# Patient Record
Sex: FEMALE | Employment: UNEMPLOYED | URBAN - METROPOLITAN AREA
[De-identification: names, ages, dates, MRNs, and addresses within clinical notes are randomized per-mention and may not be internally consistent; named-entity substitution may affect disease eponyms.]

---

## 2021-03-17 ENCOUNTER — ANCILLARY PROCEDURE (OUTPATIENT)
Dept: GENERAL RADIOLOGY | Facility: CLINIC | Age: 41
End: 2021-03-17
Attending: INTERNAL MEDICINE
Payer: COMMERCIAL

## 2021-03-17 ENCOUNTER — OFFICE VISIT (OUTPATIENT)
Dept: INTERNAL MEDICINE | Facility: CLINIC | Age: 41
End: 2021-03-17
Payer: COMMERCIAL

## 2021-03-17 VITALS
OXYGEN SATURATION: 100 % | RESPIRATION RATE: 16 BRPM | HEART RATE: 97 BPM | SYSTOLIC BLOOD PRESSURE: 110 MMHG | WEIGHT: 225 LBS | BODY MASS INDEX: 34.1 KG/M2 | TEMPERATURE: 98 F | HEIGHT: 68 IN | DIASTOLIC BLOOD PRESSURE: 76 MMHG

## 2021-03-17 DIAGNOSIS — M25.562 CHRONIC PAIN OF LEFT KNEE: ICD-10-CM

## 2021-03-17 DIAGNOSIS — M25.562 CHRONIC PAIN OF LEFT KNEE: Primary | ICD-10-CM

## 2021-03-17 DIAGNOSIS — G89.29 CHRONIC PAIN OF LEFT KNEE: Primary | ICD-10-CM

## 2021-03-17 DIAGNOSIS — G89.29 CHRONIC PAIN OF LEFT KNEE: ICD-10-CM

## 2021-03-17 PROCEDURE — 99203 OFFICE O/P NEW LOW 30 MIN: CPT | Performed by: INTERNAL MEDICINE

## 2021-03-17 PROCEDURE — 73562 X-RAY EXAM OF KNEE 3: CPT | Mod: LT | Performed by: RADIOLOGY

## 2021-03-17 SDOH — HEALTH STABILITY: MENTAL HEALTH: HOW OFTEN DO YOU HAVE 6 OR MORE DRINKS ON ONE OCCASION?: NEVER

## 2021-03-17 SDOH — HEALTH STABILITY: MENTAL HEALTH: HOW OFTEN DO YOU HAVE A DRINK CONTAINING ALCOHOL?: NEVER

## 2021-03-17 SDOH — HEALTH STABILITY: MENTAL HEALTH: HOW MANY STANDARD DRINKS CONTAINING ALCOHOL DO YOU HAVE ON A TYPICAL DAY?: NOT ASKED

## 2021-03-17 ASSESSMENT — MIFFLIN-ST. JEOR: SCORE: 1726.15

## 2021-03-17 ASSESSMENT — ENCOUNTER SYMPTOMS
JOINT SWELLING: 1
ARTHRALGIAS: 1

## 2021-03-17 NOTE — PATIENT INSTRUCTIONS
Patient Education     Knee Pain  Knee pain is very common. It s especially common in active people who put a lot of pressure on their knees, like runners. It affects women more often than men.  Your kneecap (patella) is a thick, round bone. It covers and protects the front portion of your knee joint. It moves along a groove in your thighbone (femur) as part of the patellofemoral joint. A layer of cartilage surrounds the underside of your kneecap. This layer protects it from grinding against your femur.  When this cartilage softens and breaks down, it can cause knee pain. This is partly because of repetitive stress. The stress irritates the lining of the joint. This causes pain in the underlying bone.  What causes knee pain?  Many things can cause knee pain. You may have more than one cause. Some of these include:    Overuse of the knee joint    The kneecap doesn t line up with the tissue around it    Damage to small nerves in the area    Damage to the ligament-like structure that holds the kneecap in place (retinaculum)    Breakdown of the bone under the cartilage    Swelling in the soft tissues around the kneecap    Injury  You might be more likely to have knee pain if you:    Exercise a lot    Recently increased the intensity of your workouts    Have a body mass index (BMI) greater than 25    Have poor alignment of your kneecap    Walk with your feet turned overly outward or inward    Have weakness in surrounding muscle groups (inner quad or hip adductor muscles)    Have too much tightness in surrounding muscle groups (hamstrings or iliotibial band)    Have a recent history of injury to the area    Are female  Symptoms of knee pain  This type of knee pain is a dull, aching pain in the front of the knee in the area under and around the kneecap. This pain may start quickly or slowly. Your pain might be worse when you squat, run, or sit for a long time. Climbing stairs may be painful or hard to do. You might also  sometimes feel like your knee is giving out. You may have symptoms in one or both of your knees.  Diagnosing knee pain  Your healthcare provider will ask about your medical history and your symptoms. Be sure to describe any activities that make your knee pain worse. He or she will look at your knee. This will include tests of your range of motion, strength, and areas of pain of your knee. Your knee alignment will be checked.  Your healthcare provider will need to rule out other causes of your knee pain, such as arthritis. You may need an imaging test, such as an X-ray or MRI.  Treatment for knee pain  Treatments that can help ease your symptoms may include:    Avoiding activities for a while that make your pain worse, returning to activity over time    Icing the outside of your knee when it causes you pain    Taking over-the-counter pain medicine such as NSAIDs    Wearing a knee brace or taping your knee to support it    Compression to help prevent swelling    Wearing special shoe inserts to help keep your feet in the proper alignment    Elevating your knee    Doing special exercises to stretch and strengthen the muscles around your hip and your knee  These steps help most people manage knee pain. But some cases of knee pain need to be treated with surgery. You rarely need surgery right away. You may need it later if other treatments don t work. Your healthcare provider may refer you to an orthopedic surgeon. He or she will talk with you about your choices.  Preventing knee pain  Losing weight and correcting excess muscle tightness or muscle weakness may help lower your risk.  In some cases, you can prevent knee pain. To help prevent a flare-up of knee pain, do these things:    Regularly do all the exercises your doctor or physical therapist advises    Warm up fully before exercising    Support your knee as advised by your doctor or physical therapist    Increase training gradually, and ease up on training when  needed    Have an expert check your gait for running or other sporting activities    Stretch properly before and after exercise    Replace your running shoes regularly    Lose excess weight  When to call your healthcare provider  Call your healthcare provider right away if:    Your symptoms don t get better after a few weeks of treatment    You have any new symptoms  Yang last reviewed this educational content on 6/1/2019 2000-2020 [employer]. All rights reserved. This information is not intended as a substitute for professional medical care. Always follow your healthcare professional's instructions.

## 2021-03-17 NOTE — PROGRESS NOTES
"    Assessment & Plan   Problem List Items Addressed This Visit     None      Visit Diagnoses     Chronic pain of left knee    -  Primary    Relevant Orders    XR Knee Left 3 Views    Orthopedic & Spine  Referral         Ongoing knee pain with history of popliteal cyst and spurring in the knee joint Ortho referral placed with repeat knee x-ray.    Return if symptoms worsen or fail to improve.    Michael Islas MD  [facility] NICK Vu is a 41 year old who presents for the following health issues     HPI   Phone  used to communicate.    Left knee pain x 5 months. Gardually worsening.  When she walks or stand too long, swelling comes with pain. Pain is constant. When she uses her knee while praying it hurts a lot.  Denies recent fall/ injury.  She had a fall in 2017 and landed on left hip. Denies hip pain. Denies any medication use.     She went to  for the same knee pain and had knee injection and ultrasound in 11/2020.   Xray showed   IMPRESSION:  1. Mild to moderate hypertrophic spurring medial and lateral compartment of the knees.  2. Medial compartment narrowing mildly on the left and minimally on the right.  USG: There is a 2.2 x 2.5 x 0.6 cm popliteal cyst in the left popliteal fossa.    Review of Systems   Musculoskeletal: Positive for arthralgias, gait problem and joint swelling.          Objective    /76   Pulse 97   Temp 98  F (36.7  C) (Oral)   Resp 16   Ht 1.715 m (5' 7.5\")   Wt 102.1 kg (225 lb)   LMP 02/25/2021 (Approximate)   SpO2 100%   Breastfeeding No   BMI 34.72 kg/m    Body mass index is 34.72 kg/m .  Physical Exam  Vitals signs reviewed.   Musculoskeletal:      Comments: Left knee joint looks swollen.  There is significant swelling noted in the superior aspect of the knee joint.  Range of motion was restricted due to the discomfort.   Neurological:      Mental Status: She is alert.              "

## 2021-03-24 ENCOUNTER — OFFICE VISIT (OUTPATIENT)
Dept: ORTHOPEDICS | Facility: CLINIC | Age: 41
End: 2021-03-24
Attending: INTERNAL MEDICINE
Payer: COMMERCIAL

## 2021-03-24 VITALS
BODY MASS INDEX: 34.1 KG/M2 | WEIGHT: 225 LBS | HEIGHT: 68 IN | SYSTOLIC BLOOD PRESSURE: 112 MMHG | DIASTOLIC BLOOD PRESSURE: 72 MMHG

## 2021-03-24 DIAGNOSIS — G89.29 CHRONIC PAIN OF LEFT KNEE: ICD-10-CM

## 2021-03-24 DIAGNOSIS — M17.12 PRIMARY OSTEOARTHRITIS OF LEFT KNEE: Primary | ICD-10-CM

## 2021-03-24 DIAGNOSIS — M25.562 CHRONIC PAIN OF LEFT KNEE: ICD-10-CM

## 2021-03-24 DIAGNOSIS — E66.01 MORBID OBESITY (H): ICD-10-CM

## 2021-03-24 PROCEDURE — 99203 OFFICE O/P NEW LOW 30 MIN: CPT | Performed by: ORTHOPAEDIC SURGERY

## 2021-03-24 ASSESSMENT — MIFFLIN-ST. JEOR: SCORE: 1726.15

## 2021-03-24 NOTE — PATIENT INSTRUCTIONS
"We discussed the problem of arthritis today. Arthritis means that the joint surfaces that were once smooth are getting rough. When the rough joint surfaces are loaded and gliding, you often have pain, swelling, catching/popping, and locking type of symptoms. It can be episodic or constant. Even though the process is slow developing and chronic in nature, the symptoms can come on rather suddenly sometimes triggered by an injury or at other times without any identifiable event.  Typically, even with arthritis, most people can  function quite well with a common sense management which include: activity modifications, OTC medications (e.g.. Acetaminophen and Ibuprofen or Naproxen), icing, stretching and muscle strengthening. In general, staying active helps because it will help maintaining joint flexibility and muscle strength although \"overdoing\" and doing repetitively loading activities could worsen the symptoms. If you carry extra weight, losing weight should be a part of management of arthritis. I recommend diet along with gentle aerobic exercises such as walking, elliptical, swimming and stationary biking. Activities like jumping, pivoting, squatting, lunging, stair climbing, and kneeling are better to be avoided.  You can also try over- the- counter braces (especially for the knee arthritis) but since the problem is an internal issue, it is not always helpful.  Formal PT is not always necessary but can be helpful if you are not sure about what exercises to do. A discussion with a dietician can be very helpful if you decided to include weight loss as a part of the treatment.  If these measures are not effective, we often resort to the injection treatment, either cortisone or viscous joint supplement. It has been shown that viscous joint injections are not very effective if arthritis is advanced and at this point is approved only for knee arthritis. The potential benefit from injections are not permanent and for that " reason they can be repeated at a reasonable frequency. The duration of benefit is impossible to predict. Sometimes, it does not provide any noticeable benefit at all.  As you can imagine, surgical intervention is not the first line of management. It is important to remember that even with surgery one cannot cure arthritis unless joint replacement is considered. As was the case with the injection treatment, the response from arthroscopic surgeries  is unpredictable since we cannot make the joint surfaces back to perfectly smooth. It would be an option for someone who has tried all appropriate non-operative treatment modalities and is not quite ready for replacement type of permanent procedure. Arthroscopic procedures are more applicable to the knees, the shoulders and the ankles as opposed to the hips and fingers.          Voltaren gel. Can get this over the counter  Tylenol   Ibuprofen or Aleve

## 2021-03-24 NOTE — PROGRESS NOTES
HISTORY OF PRESENT ILLNESS:    Mirella Mays is a 41 year old female who is seen in consultation at the request of Dr. Islas for left knee pain. Onset of pain 5 months ago. She reports no injury, trauma.   Present symptoms: left knee pain located lateral knee pain. Pain radiates down the lower leg into the second toe. She notes occasional numbness of her lower leg.   Pain comes and goes.  Increased pain with walking and prolonged sitting.  Swelling present in the knee. Knee pain does not wake her up at nighttime.   Current pain level: 5/10, Worst pain level: 8/10   Treatments tried to this point: cortisone injection Nov 24 2020 performed out of system. She reports it provided no pain relief.   Trial of Ibuprofen, icing.   Orthopedic PMH: chronic left knee pain since fall 2017     No past medical history on file.    No past surgical history on file.   Patient reports surgery to her womb performed in [place], 2006.     Family History   Problem Relation Age of Onset     Family History Negative Mother        Social History     Socioeconomic History     Marital status:      Spouse name: Not on file     Number of children: Not on file     Years of education: Not on file     Highest education level: Not on file   Occupational History     Not on file   Social Needs     Financial resource strain: Not on file     Food insecurity     Worry: Not on file     Inability: Not on file     Transportation needs     Medical: Not on file     Non-medical: Not on file   Tobacco Use     Smoking status: Never Smoker     Smokeless tobacco: Never Used   Substance and Sexual Activity     Alcohol use: Never     Frequency: Never     Binge frequency: Never     Drug use: Never     Sexual activity: Yes   Lifestyle     Physical activity     Days per week: Not on file     Minutes per session: Not on file     Stress: Not on file   Relationships     Social connections     Talks on phone: Not on file     Gets together: Not on file      Attends [employer] service: Not on file     Active member of club or organization: Not on file     Attends meetings of clubs or organizations: Not on file     Relationship status: Not on file     Intimate partner violence     Fear of current or ex partner: Not on file     Emotionally abused: Not on file     Physically abused: Not on file     Forced sexual activity: Not on file   Other Topics Concern     Not on file   Social History Narrative     Not on file       Current Outpatient Medications   Medication Sig Dispense Refill     cholecalciferol 25 MCG (1000 UT) TABS Take 1,000 Units by mouth         No Known Allergies    REVIEW OF SYSTEMS:  CONSTITUTIONAL:  NEGATIVE for fever, chills, change in weight  INTEGUMENTARY/SKIN:  NEGATIVE for worrisome rashes, moles or lesions  EYES:  NEGATIVE for vision changes or irritation  ENT/MOUTH:  NEGATIVE for ear, mouth and throat problems  RESP:  NEGATIVE for significant cough or SOB  BREAST:  NEGATIVE for masses, tenderness or discharge  CV:  NEGATIVE for chest pain, palpitations or peripheral edema  GI:  NEGATIVE for nausea, abdominal pain, heartburn, or change in bowel habits  :  Negative   MUSCULOSKELETAL:  See HPI above  NEURO:  NEGATIVE for weakness, dizziness or paresthesias  ENDOCRINE:  NEGATIVE for temperature intolerance, skin/hair changes  HEME/ALLERGY/IMMUNE:  NEGATIVE for bleeding problems  PSYCHIATRIC:  NEGATIVE for changes in mood or affect      PHYSICAL EXAM:  LMP 02/25/2021 (Approximate)   There is no height or weight on file to calculate BMI.   GENERAL APPEARANCE: healthy, alert and no distress   HEENT: No apparent thyroid megaly. Clear sclera with normal ocular movement  RESPIRATORY: No labored breathing  SKIN: no suspicious lesions or rashes  NEURO: Normal strength and tone, mentation intact and speech normal  VASCULAR: Good pulses, and capillary refill   LYMPH: no lymphadenopathy   PSYCH:  mentation appears normal and affect  normal/bright    MUSCULOSKELETAL:  Not in acute distress  No difficulty getting up from sitting  No limping noted when she walks  Mild effusion left knee  No effusion on the right  No erythema  Full range of motion, bilateral  Significant patellofemoral crepitus, bilateral  Tenderness more noticeable on the left with a patellofemoral compression  No specific medial or lateral joint line pain  Ligaments are stable  Circulation is intact  Sensation is intact     ASSESSMENT:  Bilateral knee DJD, 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     For a relatively young person, the degenerative changes noted on x-rays are moderate with medial joint space narrowing and significant bone spurs at the patellofemoral joint.    Based on her physical examination findings and her symptoms, the main issue is that of patellofemoral DJD.    She has done some exercises going up and down the steps and she was advised to avoid repetitive stair walking because they will aggravate her patellofemoral joint.    It was also pointed out that her weight is a contributing factor and if she loses about 20 pounds, that would help her significantly.    We talked about doing another cord injection which she is not interested in today.  She wants to try a topical medication and Voltaren gel was mentioned for her to try.    Informational materials provided  She was encouraged to stay active and engage in nonimpact activities such as exercise biking and elliptical if she has access to these    If her pain gets worse in the future, will consider cortisone injection once again.    She was informed that most likely she will be a candidate for knee replacement in the future, hopefully not for a long time.      Imaging Interpretation:     Recent Results (from the past 744 hour(s))   XR Knee Left 3 Views    Narrative    XR  KNEE LT 3 VW  3/17/2021 2:20 PM     HISTORY: Chronic left knee pain.    COMPARISON: None.      Impression    IMPRESSION:  Mild to moderate medial compartment narrowing with  hypertrophic change. Mild hypertrophic change in the lateral and  patellofemoral compartments. Normal patellar alignment. Large knee  joint effusion.     MD Armaan RAYA MD  Department of Orthopedic Surgery        Disclaimer: This note consists of symbols derived from keyboarding, dictation and/or voice recognition software. As a result, there may be errors in the script that have gone undetected. Please consider this when interpreting information found in this chart.

## 2021-03-24 NOTE — LETTER
3/24/2021         RE: Mirella Mays  [id number]  [address]  [place] [address]        Dear Colleague,    Thank you for referring your patient, Mirella Mays, to the [facility] [place]. Please see a copy of my visit note below.    HISTORY OF PRESENT ILLNESS:    Mirella Mays is a 41 year old female who is seen in consultation at the request of Dr. Islas for left knee pain. Onset of pain 5 months ago. She reports no injury, trauma.   Present symptoms: left knee pain located lateral knee pain. Pain radiates down the lower leg into the second toe. She notes occasional numbness of her lower leg.   Pain comes and goes.  Increased pain with walking and prolonged sitting.  Swelling present in the knee. Knee pain does not wake her up at nighttime.   Current pain level: 5/10, Worst pain level: 8/10   Treatments tried to this point: cortisone injection Nov 24 2020 performed out of system. She reports it provided no pain relief.   Trial of Ibuprofen, icing.   Orthopedic PMH: chronic left knee pain since fall 2017     No past medical history on file.    No past surgical history on file.   Patient reports surgery to her womb performed in [place], 2006.     Family History   Problem Relation Age of Onset     Family History Negative Mother        Social History     Socioeconomic History     Marital status:      Spouse name: Not on file     Number of children: Not on file     Years of education: Not on file     Highest education level: Not on file   Occupational History     Not on file   Social Needs     Financial resource strain: Not on file     Food insecurity     Worry: Not on file     Inability: Not on file     Transportation needs     Medical: Not on file     Non-medical: Not on file   Tobacco Use     Smoking status: Never Smoker     Smokeless tobacco: Never Used   Substance and Sexual Activity     Alcohol use: Never     Frequency: Never     Binge frequency: Never     Drug  use: Never     Sexual activity: Yes   Lifestyle     Physical activity     Days per week: Not on file     Minutes per session: Not on file     Stress: Not on file   Relationships     Social connections     Talks on phone: Not on file     Gets together: Not on file     Attends [employer] service: Not on file     Active member of club or organization: Not on file     Attends meetings of clubs or organizations: Not on file     Relationship status: Not on file     Intimate partner violence     Fear of current or ex partner: Not on file     Emotionally abused: Not on file     Physically abused: Not on file     Forced sexual activity: Not on file   Other Topics Concern     Not on file   Social History Narrative     Not on file       Current Outpatient Medications   Medication Sig Dispense Refill     cholecalciferol 25 MCG (1000 UT) TABS Take 1,000 Units by mouth         No Known Allergies    REVIEW OF SYSTEMS:  CONSTITUTIONAL:  NEGATIVE for fever, chills, change in weight  INTEGUMENTARY/SKIN:  NEGATIVE for worrisome rashes, moles or lesions  EYES:  NEGATIVE for vision changes or irritation  ENT/MOUTH:  NEGATIVE for ear, mouth and throat problems  RESP:  NEGATIVE for significant cough or SOB  BREAST:  NEGATIVE for masses, tenderness or discharge  CV:  NEGATIVE for chest pain, palpitations or peripheral edema  GI:  NEGATIVE for nausea, abdominal pain, heartburn, or change in bowel habits  :  Negative   MUSCULOSKELETAL:  See HPI above  NEURO:  NEGATIVE for weakness, dizziness or paresthesias  ENDOCRINE:  NEGATIVE for temperature intolerance, skin/hair changes  HEME/ALLERGY/IMMUNE:  NEGATIVE for bleeding problems  PSYCHIATRIC:  NEGATIVE for changes in mood or affect      PHYSICAL EXAM:  LMP 02/25/2021 (Approximate)   There is no height or weight on file to calculate BMI.   GENERAL APPEARANCE: healthy, alert and no distress   HEENT: No apparent thyroid megaly. Clear sclera with normal ocular movement  RESPIRATORY: No labored  breathing  SKIN: no suspicious lesions or rashes  NEURO: Normal strength and tone, mentation intact and speech normal  VASCULAR: Good pulses, and capillary refill   LYMPH: no lymphadenopathy   PSYCH:  mentation appears normal and affect normal/bright    MUSCULOSKELETAL:  Not in acute distress  No difficulty getting up from sitting  No limping noted when she walks  Mild effusion left knee  No effusion on the right  No erythema  Full range of motion, bilateral  Significant patellofemoral crepitus, bilateral  Tenderness more noticeable on the left with a patellofemoral compression  No specific medial or lateral joint line pain  Ligaments are stable  Circulation is intact  Sensation is intact     ASSESSMENT:  Bilateral knee DJD, 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     For a relatively young person, the degenerative changes noted on x-rays are moderate with medial joint space narrowing and significant bone spurs at the patellofemoral joint.    Based on her physical examination findings and her symptoms, the main issue is that of patellofemoral DJD.    She has done some exercises going up and down the steps and she was advised to avoid repetitive stair walking because they will aggravate her patellofemoral joint.    It was also pointed out that her weight is a contributing factor and if she loses about 20 pounds, that would help her significantly.    We talked about doing another cord injection which she is not interested in today.  She wants to try a topical medication and Voltaren gel was mentioned for her to try.    Informational materials provided  She was encouraged to stay active and engage in nonimpact activities such as exercise biking and elliptical if she has access to these    If her pain gets worse in the future, will consider cortisone injection once again.    She was  informed that most likely she will be a candidate for knee replacement in the future, hopefully not for a long time.      Imaging Interpretation:     Recent Results (from the past 744 hour(s))   XR Knee Left 3 Views    Narrative    XR KNEE LT 3 VW  3/17/2021 2:20 PM     HISTORY: Chronic left knee pain.    COMPARISON: None.      Impression    IMPRESSION:  Mild to moderate medial compartment narrowing with  hypertrophic change. Mild hypertrophic change in the lateral and  patellofemoral compartments. Normal patellar alignment. Large knee  joint effusion.     MD Armaan RAYA MD  Department of Orthopedic Surgery        Disclaimer: This note consists of symbols derived from keyboarding, dictation and/or voice recognition software. As a result, there may be errors in the script that have gone undetected. Please consider this when interpreting information found in this chart.        Again, thank you for allowing me to participate in the care of your patient.        Sincerely,        Armaan Jara MD